# Patient Record
Sex: MALE | Race: WHITE | Employment: UNEMPLOYED | URBAN - METROPOLITAN AREA
[De-identification: names, ages, dates, MRNs, and addresses within clinical notes are randomized per-mention and may not be internally consistent; named-entity substitution may affect disease eponyms.]

---
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
## 2020-02-04 ENCOUNTER — TELEPHONE (OUTPATIENT)
Dept: PSYCHIATRY | Facility: CLINIC | Age: 20
End: 2020-02-04

## 2020-02-04 NOTE — TELEPHONE ENCOUNTER
What is the concern that needs to be addressed by a nurse? We received records from the [place] [facility] in [place]. Pt's mother would like a call back regarding the follow up process on this.     May a detailed message be left on voicemail? Yes     Date of last office visit: 12/30/19    Message routed to: Suzanne Multani

## 2020-02-04 NOTE — TELEPHONE ENCOUNTER
-Writer returned call to Anjelica, informed her that patient revoked consents.  She then conferenced patient in.  Writer talked with patient and he gave verbal permission for Anjelica to discuss Ketamine Treatment with writer.        -Discussed that Dr. Carey would have to look at records before making any recommendations.  Writer will forward message to Dr. Carey regarding this.       -Did inform Anjelica that patient would have to give consent again for writer to talk with her.

## 2020-02-13 ENCOUNTER — TELEPHONE (OUTPATIENT)
Dept: PSYCHIATRY | Facility: CLINIC | Age: 20
End: 2020-02-13

## 2020-02-13 NOTE — TELEPHONE ENCOUNTER
What is the concern that needs to be addressed by a nurse?   Patient would like a call back about the status of ketamine recommendation. It looks like this was talked about last week but patient have not heard back from anyone.     May a detailed message be left on voicemail? yes    Date of last office visit:     Message routed to: nurse

## 2020-02-18 NOTE — TELEPHONE ENCOUNTER
-Writer spoke with Dr. Carey.  He stated that patient could try one more infusion at a month if his mood does not dip.  If mood dips he could continue to do monthly infusions for six months and then stop.

## 2020-02-18 NOTE — TELEPHONE ENCOUNTER
-Writer called Anjelica as patient has not been answering phone.  Anjelica stated she will try to get patient to call clinic.

## 2021-10-13 ENCOUNTER — VIRTUAL VISIT (OUTPATIENT)
Dept: PSYCHIATRY | Facility: CLINIC | Age: 21
End: 2021-10-13
Payer: COMMERCIAL

## 2021-10-13 DIAGNOSIS — F90.9 ATTENTION DEFICIT HYPERACTIVITY DISORDER (ADHD), UNSPECIFIED ADHD TYPE: Primary | ICD-10-CM

## 2021-10-13 DIAGNOSIS — F33.1 MODERATE EPISODE OF RECURRENT MAJOR DEPRESSIVE DISORDER (H): ICD-10-CM

## 2021-10-13 PROBLEM — F33.9 MAJOR DEPRESSION, RECURRENT (H): Status: ACTIVE | Noted: 2021-10-13

## 2021-10-13 ASSESSMENT — PATIENT HEALTH QUESTIONNAIRE - PHQ9: SUM OF ALL RESPONSES TO PHQ QUESTIONS 1-9: 20

## 2021-10-13 NOTE — PROGRESS NOTES
"Lonnie is a 21 year old who is being evaluated via a billable video visit.      How would you like to obtain your AVS? Mail a copy  If the video visit is dropped, the invitation should be resent by: Send to e-mail at: [email or web address]  Will anyone else be joining your video visit? No      Video Start Time: 2pm  Video-Visit Details    Type of service:  Video Visit    Video End Time:245pm    Originating Location (pt. Location): Home    Distant Location (provider location):  [facility]     Platform used for Video Visit: RemitPro     Depression Response    Patient completed the PHQ-9 assessment for depression and scored >9? Yes  Question 9 on the PHQ-9 was positive for suicidality? No  Does patient have current mental health provider? No (He does have a therapist but hasn't gone in awhile)    Is this a virtual visit? Yes   Does patient have suicidal ideation (positive question 9)? No - offer to place Mental Health Referral.  Patient declined referral/not needed    I personally notified the following: visit provider          [facility]  [address], [address]  [place] [address]  New Patient Evaluation     Lonnie Rodrigues is a 19 year old male   Therapist: was seeing 2 therapists but stopped  PCP: Roosevelt Almendarez  Other Providers: None    Pertinent Background:  Hx of depression since childhood--negative ruminations, self-critical, low energy. Chronic passive SI. Followed by Dr. Ashraf.   Psych critical item history includes mutiple psychotropic trials.       Chief Complaint                                                                                                       \"I want to go back on my ADHD medications\"     History of Present Illness                                                                                      Pertinent Background and Present Symptoms:    Lonnie reports that \"there's nothing going on and that's the problem\". Unemployed and not in school. Has been this " "way for three years. Sits around reads, watches TV. \"I don't think depression is the problem\". Depression is making it worse but \"I don't think it's the root cause\". Thinks unmedicated ADHD is root cause. Endorses symptoms of depression including hopelessness, apathy, decreased mood (more numb than sad). Denies anhedonia saying \"when I do things I enjoy them but I can't bring myself to do them\".     Last happy time was when lived with dad in high school but then went back to living with mother and symptoms and functioning worsened.     Says he will \"never go back on antidepressants.\" Worst he's ever felt was on antidepressants \"and I had to beg to get off of them\".     Goals: go back to school and move on with his life, get a music degree. Plays all kinds of music, mostly jazz, doesn't play as much although started getting back into it a little more. Feels good after he plays and recognizes its good for him but can't get himself to do it.     Was on ADHD medication in the past from age [age]. Had to beg to get off of vyvanse b/c of sleep problems. Thinks he was on other shorter acting meds but can't remember. Staterra had bad side effects. Had neuropsych testing done \"somewhere in [place]\" when at [employer] (says this was closed down for \"human rights abuses\").     Anxiety: \"I have anxiety I guess, I'm diagnosed with it\", think it's more situational.     Social life is nonexistent. Has one friend.    Hates drinking, occasional drinking, denies cannabis or other drugs since high school.     Hasn't seen therapist in a long time b/c doesn't think it was helpful.     TMS and ketamine \"kind of worked\" but they were only temporary fixes.     Denies SI.     Psychiatric Review of Symptoms:     Alana: Negative  Psychosis: Negative   Eating disorder: Negative  Homicidal Ideation: Negative       Recent Substance Use:  Denies         Substance Use History     Prior cannabis and EtOH use, apparantally dismissed " "from school for drug use.      Psychiatric History     MDD, DANG, ADHD       Social/ Family History               [per patient report]                                                   GED from high school, living on his own, unemployed, would like to go back to school for music       Psychiatric Medication Trials       Past medication trials (in chronological order), per mom- no outside records available at this time:   - Prozac started for aggression and mood lability.   Efficacy: Unsure.   Side effects: none noted. Mom doesn't recall any worsening symptoms or side effects   Duration:  2006 to unknown date (Dr. Hoang)    - Risperidone started for behaviors and aggression.   Efficacy: was \"calming\" for a couple weeks, but wore off  Side effects: Increased appetite and weight gain  Duration: 2008 to spring 2009 (Dr. Hoang)     - Adderall, Concerta, Focalin XR trials for possible ADHD  Efficacy: none  Side effects: one caused increased hyperactivity (mom can t remember which). Mom doesn't recall worsening mood or behavior symptoms with stimulant trials, just that they weren't effective.   Duration: 2008 to 2009 (Dr. Hoang)    - Seroquel, Dexedrine, Tenex combination started during [facility].   Efficacy: none  Side effects: increased irritability, increased appetite, sedation and crashing at the end of the day. This combination was \"awful\".    Duration: several weeks-month in 2009    - Lamictal monotherapy for mood instability  Efficacy: limited/unsure  Side effects: none noted   Duration 2009-unknown Dr. Sharath Cruz   Efficacy: None  Side effects: None  Duration: unknown    -Abilify, up to 12-15mg   Efficacy: helpful for several months, improved irritability, more stable per mom. Over time lost efficacy. Lonnie reports may have helped with interpersonal relationships, but doesn't think it helped with depression or anxiety.   Side effects: weight gain  Duration: several years; " "perhaps [id number]? Dr. Sharath Ramos    - Lexapro + Abilify   Efficacy: limited, possibly increased irritability. Mom reports irritability improved dramatically once Lexapro was discontinued.   Side effects:  Activation/irritability?  Duration: several months in [id number]     - Wellbutrin + Abilify: to help with weight gain from Abilify.   Efficacy: None  Side effects: None noted  Duration: on and off 2015- 2017    Metformin trial for weight gain  Efficacy: limited  Side Effects: none noted  Duration: unknown, 2015     - Vyvanse 20-50mg for appetite suppression and possibly ADHD symptoms  Efficacy: none  Side Effects: increased anxiety, sweating, increased HR, headaches per Lonnie. \"I was miserable on it\".   Duration: on and off in 2015 2016- slowly tapered Abilify. Appetite decreased and moods unchanged. Mom and Lonnie are in agreement that stopping Abilify was a good choice. Side effects (weight gain) were outweighing the benefit (limited efficacy for symptoms)     [id number]- Lonnie self-discontinued Wellbutrin. Didn't notice any changes in symptoms.       Overall, Lonnie reports that no past medication trials have been very helpful. Feels that periods when he was doing or feeling better was situational and not a result of medications. Mom reports feeling that overall, some medications (primarily Abilify) were helpful for a period of time but then wore off.          Medical / Surgical History     Patient Active Problem List   Diagnosis     Posttraumatic stress disorder     Persistent depressive disorder       History reviewed. No pertinent surgical history.      Medical Review of Systems                                                                                                      Negative except as per HPI       Allergy   Patient has no known allergies.     Current Medications     Current Outpatient Medications   Medication Sig Dispense Refill     ALPRAZolam (XANAX) 0.25 MG tablet Take 0.25 mg by mouth as " "needed   0     esketamine (SPRAVATO) 56 mg dose kit Apply 56 mg into one nostril as directed twice a week (Patient not taking: Reported on 12/30/2019) 14 kit 0        Vitals                                                                                                                             There were no vitals taken for this visit.      Mental Status Exam                                                                                        Alertness: alert  and oriented  Appearance: casually groomed  Behavior/Demeanor: cooperative, pleasant and calm, with good  eye contact   Speech: normal and regular rate and rhythm  Language: intact  Psychomotor: normal or unremarkable  Mood: \"ok\"  Affect: restricted; was congruent to mood; was congruent to content  Thought Process/Associations: unremarkable  Thought Content:  Reports none;  Denies suicidal and violent ideation  Perception:  Reports none;  Denies auditory hallucinations  Insight: adequate  Judgment: fair  Cognition: (6) oriented: time, person, and place  attention span: fair  concentration: fair  recent memory: intact  remote memory: intact  fund of knowledge: appropriate       Labs and Data         PHQ9 Today:  20  PHQ 10/8/2019 12/30/2019 10/13/2021   PHQ-9 Total Score 17 6 20   Q9: Thoughts of better off dead/self-harm past 2 weeks Not at all Not at all Not at all   Some encounter information is confidential and restricted. Go to Review Flowsheets activity to see all data.         No lab results found.  No lab results found.    Diagnosis and Assessment                                                                                  Lonnie's depressive symptoms are significant and I feel deserving of treatment,  he is not interested in antidepressant treatment. He feels strongly his symptoms stem from untreated ADHD. He feels that the side effects from antidepressant medications have far outweighed the benefits in the past. While ketamine and TMS helped " he says they were only temporary and is not interested in retreatment at the moment.  He does have symptoms and history consistent with ADHD and should be worked up for it.     Suicide Risk Assessment:  Today Lonnie Rodrigues reports no SI, intent or plan. In addition, he has notable risk factors for self-harm, including male, hopelessness. However, risk is mitigated by no h/o suicide attempt, no plan or intent and no access to lethal means. Therefore, based on all available evidence including the factors cited above, he does not appear to be at imminent risk for self-harm, does not meet criteria for a 72-hr hold, and therefore involuntary hospitalization will not be pursued at this  time.     Today the following issues were addressed:    1) Apathy  2) impaired concentration  3) hopelessness        Plan                                                                                                                          1) Major depressive disorder  -- Medications: Continue current outpatient psychotropic medications    -- Psychotherapy: Start regular individual psychotherapy       -- Procedures:    - NA  -- Referrals: ADHD evaluation        CRISIS NUMBERS:   Provided routinely in AVS.    Treatment Risk Statement:  The patient understands the risks, benefits, adverse effects and alternatives. Agrees to treatment with the capacity to do so. No medical contraindications to treatment. Agrees to call clinic for any problems. The patient understands to call 911 or go to the nearest ED if life threatening or urgent symptoms occur.            PROVIDER:  Juan Hess MD    Time based billing.  Time on day of service includes:  45min spent face to face with the patient   2* minutes pre-reviewing records  20 minutes preparing consultation note and follow up messages/documentation

## 2021-10-21 ENCOUNTER — TELEPHONE (OUTPATIENT)
Dept: PSYCHIATRY | Facility: CLINIC | Age: 21
End: 2021-10-21

## 2021-10-21 NOTE — TELEPHONE ENCOUNTER
From: Lorena Latif   Sent: 10/14/2021  10:10 AM CDT   To: Suzanne Multani, RN   Subject: RE: ADHD referral                                 We unfortunately don't have much availability for new patients right now. The soonest I could get pt in with Dr Paulson's clinic would in February. You can also try the behavioral health service line at [phone] for other [facility] options, or Dr Hess can do an e-consult with psychiatry if he wants some extra support managing the adhd medication

## 2021-10-21 NOTE — TELEPHONE ENCOUNTER
Called patient to go over options.  Received voicemail.  Left message asking for a return call.  Clinic number provided.

## 2023-11-22 NOTE — PROGRESS NOTES
[facility] TMS Program  [address], [address]  [place] [address]  TMS Procedure Note   Lonnie Rodrigues MRN# [id number]  Age: 18 year old year old YOB: 2000  Date: 04/01/2019    Pre-Procedure:  History and Physical: Reviewed in medical record  Consent Signed by: Lonnie Rodrigues  On: 3/15/19    Clinical Narrative:  Patient tolerating treatment.    Indications for TMS:  MDD, recurrent, severe; 4+ medication trials (from 2+ classes) ineffective; Psychotherapy ineffective.     Pre-Procedure Diagnosis:  MDD, recurrent, severe 296.33    Treatment Hx:  Treatment number this series: 12  Total lifetime treatment number: 12    No Known Allergies   /80 (BP Location: Right arm, Patient Position: Sitting, Cuff Size: Adult Regular)   Pulse 65       Pause for the Cause  Right patient:  Yes  Right procedure/correct coil:  Yes; rTMS; cpt 43679; F8 coil.   Earplugs in place:  Yes    Procedure  Patient was seated in procedure chair. Identity and procedure was verified. Ear plugs were placed in ears and patient-specific cap was placed on head and tightened appropriately. Ruler locations were verified. Coil was placed at treatment location and stimulator was set to parameters described below. A test train was delivered and pt tolerated train. Given pt tolerance, 83 treatment trains were delivered. Pt tolerated procedure well with some left eyebrow movements.        Motor Threshold Determination  Distance from nasion to inion: 39  Tragus to Tragus distance: 38  Head Circumference: 58  F3 location:  Distance along circumfrence from midline: 6.71 cm  Distance from vertex: 10.18 cm    MT 1: C3 @ 66% on 03/15/19  MT 2: C3 @ 62% on 03/26/19    Stimulation Parameters  Frequency: 10 Hz     Train duration: 4 sec  Total pulses delivered: 3320  Inter-train interval: 15 sec  Tx Loc: F3  Energy: 74% (120% MT)  Trains: 83 trains, making up for missed trains    Rating Scales:  Date MADRS IDS-SR PHQ-9    3/15/19    27  Spoke to patient who states he is having a procedure next week Wednesday and has to hold warfarin 5 days prior which would begin this Friday 11/24/23    Do you want patient bridging? States he did this about 2 weeks ago and did not bridge. Can pt resume warfarin night of procedure?   11    3/22/19    45 11    3/29/19  29 6                   Post-Procedure Diagnosis:  MDD, recurrent, severe 296.33    Angie Luna  [facility]  Mental [facility] Neuromodulation    Plan   - Cont TMS  - Need to add 10 more train this week to make up for missed one    I didn't see the patient during/after treatment but remained available in clinic during treatment.       Analilia Carey MD  [facility] Neuromodulation